# Patient Record
Sex: MALE | Race: OTHER | HISPANIC OR LATINO | URBAN - METROPOLITAN AREA
[De-identification: names, ages, dates, MRNs, and addresses within clinical notes are randomized per-mention and may not be internally consistent; named-entity substitution may affect disease eponyms.]

---

## 2020-07-12 ENCOUNTER — EMERGENCY (EMERGENCY)
Facility: HOSPITAL | Age: 25
LOS: 1 days | Discharge: HOME | End: 2020-07-12
Attending: EMERGENCY MEDICINE | Admitting: EMERGENCY MEDICINE
Payer: MEDICAID

## 2020-07-12 VITALS
DIASTOLIC BLOOD PRESSURE: 71 MMHG | RESPIRATION RATE: 18 BRPM | HEART RATE: 102 BPM | OXYGEN SATURATION: 96 % | SYSTOLIC BLOOD PRESSURE: 125 MMHG | TEMPERATURE: 98 F

## 2020-07-12 PROCEDURE — 99053 MED SERV 10PM-8AM 24 HR FAC: CPT

## 2020-07-12 PROCEDURE — 70450 CT HEAD/BRAIN W/O DYE: CPT | Mod: 26

## 2020-07-12 PROCEDURE — 72125 CT NECK SPINE W/O DYE: CPT | Mod: 26

## 2020-07-12 PROCEDURE — 99284 EMERGENCY DEPT VISIT MOD MDM: CPT

## 2020-07-12 NOTE — ED PROVIDER NOTE - OBJECTIVE STATEMENT
23 yo M presenting from EMS for intoxication. Patient was sedated on way to hospital as he was agitated. Per EMS patient was drinking at home and was combative so family called 911. Patient sleeping at this time so no history could be attained from him. Spoke with his girlfriend who stated that patient was drinking heavily and fell and hit back of his head.

## 2020-07-12 NOTE — ED ADULT TRIAGE NOTE - CHIEF COMPLAINT QUOTE
Pt brought in by EMS from home.  Family called because pt was drinking too much, approx 1 liter of mixed drinks mixed with marijuana.  As per EMS, pt was combative and aggressive, received 10mg IM versed en route to hospital.  Respirations even and unlabored.  No PMHx.

## 2020-07-12 NOTE — ED PROVIDER NOTE - ATTENDING CONTRIBUTION TO CARE
25 y/o M with no known PMH BIB EMS for intoxication.  Per EMS report, pt's family called as pt had "drank too much" and was smoking marijuana.  Pt apparently was "getting out of control" and family had to call EMS.  Pt received IM versed prior to arrival due to agitation and combativeness.  On arrival pt is sleeping, unable to provide any hx.  Well but disheveled appearing, lying in stretcher, sleeping, nontoxic.  VSS.  NCAT EOMI PERRL.  Neck supple, no midline cervical deformity.  Lungs cta bl.  Cards nl S1/S2, RRR, no MRG.  Abd soft ntnd.  No pedal edema or calf tenderness.  Pelvis stable, no obvious deformities, FROM of all extremities.  Plan for fs, reassess for sobriety.

## 2020-07-12 NOTE — ED PROVIDER NOTE - PHYSICAL EXAMINATION
Gen: Sleeping. Lying comfortably in bed.  HEENT: superficial hematoma at back of head with abrasion of skin. no nasal discharge, mucous membranes moist  CV: Regular rate and rhythm, +S1/S2, no murmurs/rubs/gallops,   Resp: Clear to ausculation bilaterally, no wheezes/rhonchi/rales  GI: Abdomen soft non-distended, non tender to palpation, no masses  MSK: No open wounds, no bruising, no LE edema, Homans sign negative bl  Neuro: Pupils equal and reactive bl.

## 2020-07-12 NOTE — ED PROVIDER NOTE - PATIENT PORTAL LINK FT
You can access the FollowMyHealth Patient Portal offered by [facility] by registering at the following website: [email or web address]. By joining [employer]’s FollowMyHealth portal, you will also be able to view your health information using other applications (apps) compatible with our system.

## 2020-07-12 NOTE — ED PROVIDER NOTE - CLINICAL SUMMARY MEDICAL DECISION MAKING FREE TEXT BOX
Joseph Frankel PGY2: 23 yo M with intoxication and head trauma. VSS. Patient looks well and is non toxic appearing. PE as above. Given that patient is drunk and sedated with superficial hematoma will scan head and neck. Will reassess.

## 2020-07-12 NOTE — ED ADULT NURSE NOTE - OBJECTIVE STATEMENT
Break RN: Pt. received to room 16, brought in by EMS for agitation. Per EMS, family called when pt. became aggressive after having multiple mixed drinks and smoking marijuana. Pt. aggressive en route to hospital, given 10mg IM versed. Pt. sleeping upon arrival to room. Respirations even and unlabored. 100% on RA. MD at bedside. Awaiting further orders. Report given to primary RN.

## 2021-01-01 NOTE — ED ADULT TRIAGE NOTE - PATIENT ON (OXYGEN DELIVERY METHOD)
room air
You can access the FollowMyHealth Patient Portal offered by [facility] by registering at the following website: [email or web address]. By joining [email or web address]’s FollowMyHealth portal, you will also be able to view your health information using other applications (apps) compatible with our system.

## 2021-08-15 NOTE — ED PROVIDER NOTE - PROGRESS NOTE DETAILS
Joseph Frankel PGY2: Patient awake, AandOx4 and walking. CT negative. Girlfriend called and will come to pick patient up. CRITERIA TO BE MET: